# Patient Record
Sex: FEMALE | Race: WHITE
[De-identification: names, ages, dates, MRNs, and addresses within clinical notes are randomized per-mention and may not be internally consistent; named-entity substitution may affect disease eponyms.]

---

## 2018-02-17 ENCOUNTER — HEALTH MAINTENANCE LETTER (OUTPATIENT)
Age: 38
End: 2018-02-17

## 2018-05-07 ENCOUNTER — OFFICE VISIT (OUTPATIENT)
Dept: INTERNAL MEDICINE | Facility: CLINIC | Age: 38
End: 2018-05-07
Payer: COMMERCIAL

## 2018-05-07 VITALS
HEIGHT: 67 IN | WEIGHT: 163.4 LBS | HEART RATE: 73 BPM | BODY MASS INDEX: 25.65 KG/M2 | SYSTOLIC BLOOD PRESSURE: 108 MMHG | OXYGEN SATURATION: 97 % | DIASTOLIC BLOOD PRESSURE: 68 MMHG

## 2018-05-07 DIAGNOSIS — E03.9 HYPOTHYROIDISM, UNSPECIFIED TYPE: ICD-10-CM

## 2018-05-07 DIAGNOSIS — R53.83 OTHER FATIGUE: Primary | ICD-10-CM

## 2018-05-07 DIAGNOSIS — F34.1 DYSTHYMIC DISORDER: ICD-10-CM

## 2018-05-07 DIAGNOSIS — Z00.00 ROUTINE GENERAL MEDICAL EXAMINATION AT A HEALTH CARE FACILITY: ICD-10-CM

## 2018-05-07 DIAGNOSIS — R53.83 OTHER FATIGUE: ICD-10-CM

## 2018-05-07 LAB
CREAT SERPL-MCNC: 0.72 MG/DL (ref 0.52–1.04)
DEPRECATED CALCIDIOL+CALCIFEROL SERPL-MC: 42 UG/L (ref 20–75)
ERYTHROCYTE [DISTWIDTH] IN BLOOD BY AUTOMATED COUNT: 12.3 % (ref 10–15)
FERRITIN SERPL-MCNC: 33 NG/ML (ref 12–150)
GFR SERPL CREATININE-BSD FRML MDRD: >90 ML/MIN/1.7M2
HCT VFR BLD AUTO: 44.1 % (ref 35–47)
HGB BLD-MCNC: 14 G/DL (ref 11.7–15.7)
MCH RBC QN AUTO: 28.6 PG (ref 26.5–33)
MCHC RBC AUTO-ENTMCNC: 31.7 G/DL (ref 31.5–36.5)
MCV RBC AUTO: 90 FL (ref 78–100)
PLATELET # BLD AUTO: 312 10E9/L (ref 150–450)
RBC # BLD AUTO: 4.89 10E12/L (ref 3.8–5.2)
T3 SERPL-MCNC: 145 NG/DL (ref 60–181)
TSH SERPL DL<=0.005 MIU/L-ACNC: 1.05 MU/L (ref 0.4–4)
WBC # BLD AUTO: 6.9 10E9/L (ref 4–11)

## 2018-05-07 RX ORDER — THYROID 120 MG/1
120 TABLET ORAL DAILY
COMMUNITY
End: 2018-10-26

## 2018-05-07 ASSESSMENT — ENCOUNTER SYMPTOMS
HEADACHES: 1
LIGHT-HEADEDNESS: 1
TREMORS: 0
POLYPHAGIA: 0
WEAKNESS: 0
EXERCISE INTOLERANCE: 0
SLEEP DISTURBANCES DUE TO BREATHING: 0
DISTURBANCES IN COORDINATION: 0
PARALYSIS: 0
ORTHOPNEA: 0
SPEECH CHANGE: 0
WEIGHT LOSS: 0
FEVER: 0
MEMORY LOSS: 0
SYNCOPE: 0
HYPOTENSION: 1
LOSS OF CONSCIOUSNESS: 0
DIZZINESS: 0
HALLUCINATIONS: 0
PALPITATIONS: 0
HYPERTENSION: 0
INCREASED ENERGY: 0
POLYDIPSIA: 0
TINGLING: 0
LEG PAIN: 0
NUMBNESS: 0
WEIGHT GAIN: 0
ALTERED TEMPERATURE REGULATION: 1
NIGHT SWEATS: 0
DECREASED APPETITE: 0
FATIGUE: 1
SEIZURES: 0
CHILLS: 1

## 2018-05-07 ASSESSMENT — PAIN SCALES - GENERAL: PAINLEVEL: NO PAIN (0)

## 2018-05-07 NOTE — PATIENT INSTRUCTIONS
[facility]: [phone]     [facility] Medication Refill Request Information:  * Please contact your pharmacy regarding ANY request for medication refills.  ** [facility] Prescription Fax = [phone]  * Please allow 3 business days for routine medication refills.  * Please allow 5 business days for controlled substance medication refills.     [facility] Test Result notification information:  *You will be notified with in 7-10 days of your appointment day regarding the results of your test.  If you are on MyChart you will be notified as soon as the provider has reviewed the results and signed off on them.

## 2018-05-07 NOTE — PROGRESS NOTES
[facility]  Primary Care Center   Dena Guerra MD  05/07/2018      Chief Complaint:   Establish Care    History of Present Illness:   Michelle Maier is a 38 year old female with a history of postpartum depression and hypothyroidism who presents alone to [facility]. The patient is a [place] resident, but is often times in [place] or [place] for a summer camp. She has a three year old son.    Fatigue/Thyroid: The patient has been experiencing fatigue since the birth of her son three years ago. She understands the trials of parenthood, but felt that she would have started feeling better after this length of time. She experienced post-partum depression following her sons birth. She reports falling asleep around 8pm, waking up in the middle of the night for an hour, then getting out of bed around 6 or 7 in the morning. She feels that she sleeps too much and is unusually tired. She does snore. She reports that her hormones changed a lot after she finished breast feeding and also gained a lot of weight. The patient believes her fatigue may be due to hypothyroidism as well, which was diagnosed in 2006 and she would like checked today. She switched to armour a year ago because her prior medication caused a 10-12 pound weight gain and fatigue. The patient reports snoring at night and a regular period with ParaGard and denies restlessness and recent fevers, nausea, weight loss or night sweats..    Stress: The patient moves around a lot and is currently building a house in [place], which creates a lot of stress for her. She feels that she is depressed and would like to start therapy. She previously used antidepressants when her father passed away, but would rather not use medication this time.    Heat Stroke: The patient reports a heat stroke episode yesterday and felt that she almost fainted. Was driving with her son and become sweaty, clammy, and experienced a period cramp. When she arrived to her families house  she immediately laid down on the cold floor. She believes she may have been dehydrated, but this does not regularly happen to her.    Other concerns discussed:  1. She experienced 3rd degree tearing after child birth which created immense pain when she would sit down or have a bowel movement. This lasted for eight months.  2. She had a positive test for a genetic marker for celiac but negative serologies at [place]. She reports excessive acid was found that her body was unable to break down gluten.  3. Previously had a Vitamin D deficiency even though she spends a lot of time outside.  4. Patient has an eye doctor appointment in three days.    We reviewed the following health maintenance topics:  Immunizations  Cancer Screenings (Pap, mammogram, colonoscopy, prostate, skin, lung)  Lipids, STDs, HIV/Hep C screening if applicable  AAA screening, Dexa screening if applicable  Lifestyle factors/Saftey (diet, exercise, weight, stress, seat belts, sun protection)  Risk behaviors (tobacco, alcohol, illicit substances, abuse/violence, depression)  Advanced directive planning if applicable      Review of Systems:   Pertinent items are noted in HPI, remainder of complete ROS is negative.    Answers for HPI/ROS submitted by the patient on 5/7/2018   General Symptoms: Yes  Skin Symptoms: No  HENT Symptoms: No  EYE SYMPTOMS: No  HEART SYMPTOMS: Yes  LUNG SYMPTOMS: No  INTESTINAL SYMPTOMS: No  URINARY SYMPTOMS: No  GYNECOLOGIC SYMPTOMS: No  BREAST SYMPTOMS: No  SKELETAL SYMPTOMS: No  BLOOD SYMPTOMS: No  NERVOUS SYSTEM SYMPTOMS: Yes  MENTAL HEALTH SYMPTOMS: No  Fever: No  Loss of appetite: No  Weight loss: No  Weight gain: No  Fatigue: Yes  Night sweats: No  Chills: Yes  Increased stress: Yes  Excessive hunger: No  Excessive thirst: No  Feeling hot or cold when others believe the temperature is normal: Yes  Loss of height: No  Post-operative complications: No  Surgical site pain: No  Hallucinations: No  Change in or Loss of Energy:  "No  Hyperactivity: No  Confusion: No  Chest pain or pressure: No  Fast or irregular heartbeat: No  Pain in legs with walking: No  Trouble breathing while lying down: No  Fingers or toes appear blue: No  High blood pressure: No  Low blood pressure: Yes  Fainting: No  Murmurs: No  Pacemaker: No  Varicose veins: No  Edema or swelling: No  Wake up at night with shortness of breath: No  Light-headedness: Yes  Exercise intolerance: No  Trouble with coordination: No  Dizziness or trouble with balance: No  Fainting or black-out spells: No  Memory loss: No  Headache: Yes  Seizures: No  Speech problems: No  Tingling: No  Tremor: No  Weakness: No  Difficulty walking: No  Paralysis: No  Numbness: No    Active Medications:      thyroid (ARMOUR THYROID) 120 MG tablet, Take 120 mg by mouth daily, Disp: , Rfl:       Allergies:   Review of patient's allergies indicates no known allergies.      Past Medical History:  Postpartum depression  Depression   Hypothyroidism   Duodenal ulcer     Past Surgical History:  The following history was supplemented using records from [facility] healthcare [facility].   Colonoscopy  Esophagogastroduodenoscopy     Family History:   Mother: undifferentiated connective tissue disease  Father: myelodysplastic syndrome, aplastic anemia       Social History:   The patient is , does not smoke and consumes alcohol.  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 No  BMI 25.35 kg/m2    Constitutional: Alert, oriented, pleasant, no acute distress  Head: Normocephalic, atraumatic  Eyes: Extra-ocular movements intact, no scleral icterus  ENT: Oropharynx clear, moist mucus " membranes, good dentition  Neck: Supple, no lymphadenopathy, no thyromegaly. No thyromegaly or nodules appreciated.  Breast: Deferred per patient.  Cardiovascular: Regular rate and rhythm, no murmurs, rubs or gallops, peripheral pulses full/symmetric  Respiratory: Good air movement bilaterally, lungs clear, no wheezes/rales/rhonchi  GI: Abdomen soft, bowel sounds present, nondistended, nontender, no organomegaly or masses, no rebound/guarding  Musculoskeletal: No edema, normal muscle tone, normal gait  Neurologic: Alert and oriented, cranial nerves 2-12 intact.  Skin: No rashes/lesions  Psychiatric: normal mentation, sad affect and mood.    Assessment and Plan:  Hypothyroidism, unspecified type  I did discuss with her the difference between [place] thyroid medication and levothyroxine. We decided that if her levels remain stable, she will stay on [place] for now given her upcoming travel and difficulty with medication adjustments. However, she is open to changing over to levothyroxine in the future.    Other fatigue  Possibly multifactorial related to thyroid disease and mild depression. She also reports snoring, though she has no other risk factors for TREV. We will check her labs today, I encouraged her to try to get regular exercise and to consider a mental health referral if needed.  - TSH with free T4 reflex  - CBC with platelets  - Vitamin D Deficiency  - Ferritin  - T3 total  - Creatinine    Dysthymic disorder  See above    Routine Health Maintenance  Immunizations (zoster, pneumovax, flu, Tdap, Hep A/B):  The following history was supplemented using records from [facility] healthcare [facility].     Name Dates Previously Given Next Due   HepA, Unspecified 01/01/2010     Td, (Adult) Unspecified 01/01/2010     TyVi (inj) 01/01/2010      Lipids: n/a, normal at [place] previously  Lung Ca Screening (>30 pk age 55-79 or >20 py age 50-79 + RF): n/a  Colonoscopy (50-75 yrs): n/a  Dexa (>65W or 70M yrs): n/a  Mammogram  (40-75 yrs): n/a  Pap (21-65 yrs): Up to date, due in 2019  Safety/Lifestyle: The patient notes working out, but intends to increase her exercise amounts..  Tob/EtOH: low risk  Depression: reviewed  Advanced Directive: deferred    Follow-up: Return if symptoms worsen or fail to improve.      Scribe Disclosure:   I, Myrtle Valdez, am serving as a scribe to document services personally performed by Dena Guerra MD at this visit, based upon the provider's statements to me. All documentation has been reviewed by the aforementioned provider prior to being entered into the official medical record.     Portions of this medical record were completed by a scribe. UPON MY REVIEW AND AUTHENTICATION BY ELECTRONIC SIGNATURE, this confirms (a) I performed the applicable clinical services, and (b) the record is accurate.   Dena Guerra MD  Internal Medicine

## 2018-05-07 NOTE — NURSING NOTE
Chief Complaint   Patient presents with     Establish Care     Patient is here to estbalish a new PCP.       Gyn Exam     Papa is here for pap smear.      Gena Rodriguez LPN at 12:02 PM on 5/7/2018.

## 2018-05-07 NOTE — MR AVS SNAPSHOT
After Visit Summary   5/7/2018    Michelle Maier    MRN: [id number]           Patient Information     Date Of Birth          1980        Visit Information        Provider Department      5/7/2018 12:10 PM Dena Guerra MD [facility]        Today's Diagnoses     Other fatigue    -  1    Routine general medical examination at a health care facility        Hypothyroidism, unspecified type        Dysthymic disorder          Care Instructions    Primary Care Center: [phone]     Primary Care Center Medication Refill Request Information:  * Please contact your pharmacy regarding ANY request for medication refills.  ** [facility] Prescription Fax = [phone]  * Please allow 3 business days for routine medication refills.  * Please allow 5 business days for controlled substance medication refills.     Primary Care Center Test Result notification information:  *You will be notified with in 7-10 days of your appointment day regarding the results of your test.  If you are on MyChart you will be notified as soon as the provider has reviewed the results and signed off on them.          Follow-ups after your visit        Follow-up notes from your care team     Return if symptoms worsen or fail to improve.      Your next 10 appointments already scheduled     May 07, 2018  1:00 PM CDT   LAB with [facility] ([facility])    [address] [place] [id number] [phone]           Please do not eat 10-12 hours before your appointment if you are coming in fasting for labs on lipids, cholesterol, or glucose (sugar). This does not apply to pregnant women. Water, hot tea and black coffee (with nothing added) are okay. Do not drink other fluids, diet soda or chew gum.              Future tests that were ordered for you today     Open Future Orders        Priority Expected Expires Ordered    Creatinine Routine 5/7/2018 5/7/2019  "2018    TSH with free T4 reflex Routine 2018    CBC with platelets Routine 2018    Vitamin D Deficiency Routine 2018    Ferritin Routine 2018    T3 total Routine 2018            Who to contact     Please call your clinic at [phone] to:    Ask questions about your health    Make or cancel appointments    Discuss your medicines    Learn about your test results    Speak to your doctor            Additional Information About Your Visit        [employer] Information     [employer] is an electronic gateway that provides easy, online access to your medical records. With [employer], you can request a clinic appointment, read your test results, renew a prescription or communicate with your care team.     To sign up for [employer] visit the website at [email or web address]   You will be asked to enter the access code listed below, as well as some personal information. Please follow the directions to create your username and password.     Your access code is: [email or web address]  Expires: 2018  6:30 AM     Your access code will  in 90 days. If you need help or a new code, please contact your [facility] or call [phone] for assistance.        Care EveryWhere ID     This is your Care EveryWhere ID. This could be used by other organizations to access your [place] medical records  [id number]        Your Vitals Were     Pulse Height Last Period Pulse Oximetry Breastfeeding? BMI (Body Mass Index)    73 1.71 m (5' 7.32\") 2018 (Approximate) 97% No 25.35 kg/m2       Blood Pressure from Last 3 Encounters:   18 108/68    Weight from Last 3 Encounters:   18 74.1 kg (163 lb 6.4 oz)               Primary Care Provider Office Phone # Fax #    Dena Guerra [phone]       [address] [address]        Equal Access to " Services     [facility]: Hadii aad ku hadvanesadeya Escobar, watrentda luqadaha, qaybta kajacobsarthak cochran, charlotte galdamez. So [facility] [phone].    ATENCIÓN: Si habla español, tiene a zepeda disposición servicios gratuitos de asistencia lingüística. Llame al [phone].    We comply with applicable federal civil rights laws and [place] laws. We do not discriminate on the basis of race, color, national origin, age, disability, sex, sexual orientation, or gender identity.            Thank you!     Thank you for choosing [facility]  for your care. Our goal is always to provide you with excellent care. Hearing back from our patients is one way we can continue to improve our services. Please take a few minutes to complete the written survey that you may receive in the mail after your visit with us. Thank you!             Your Updated Medication List - Protect others around you: Learn how to safely use, store and throw away your medicines at www.disposemymeds.org.          This list is accurate as of 5/7/18 12:53 PM.  Always use your most recent med list.                   Brand Name Dispense Instructions for use Diagnosis    ARMOUR THYROID 120 MG tablet   Generic drug:  thyroid      Take 120 mg by mouth daily

## 2018-05-09 LAB
TTG IGA SER-ACNC: 1 U/ML
TTG IGG SER-ACNC: 1 U/ML

## 2018-05-11 LAB — ENDOMYSIUM IGA TITR SER IF: NORMAL {TITER}

## 2018-10-11 ENCOUNTER — TELEPHONE (OUTPATIENT)
Dept: INTERNAL MEDICINE | Facility: CLINIC | Age: 38
End: 2018-10-11

## 2018-10-11 NOTE — TELEPHONE ENCOUNTER
[facility] Call Center    Phone Message    May a detailed message be left on voicemail: yes    Reason for Call: Other: Patient is returning [facility] call. Patient stated if possible she can email her back on the email on file. I advised patient i am not sure they can email but i would let you know. Other wise you can call her back she will be available on her [phone] number. Please follow up Thank you.     Action Taken: Message routed to:  [facility] ([facility]): Primary Care

## 2018-10-11 NOTE — TELEPHONE ENCOUNTER
[facility] Call Center    Phone Message    May a detailed message be left on voicemail: yes    Reason for Call: Other: Pt called in regards to whether or not she needs to get a bloodwork or lab done for Thyroid to continue her precscription. Pt has only one refill left and would like to continue the prescription. Please call back to pt.      Action Taken: Message routed to:  [facility] ([facility]): UGO

## 2018-10-12 NOTE — TELEPHONE ENCOUNTER
Spoke to patient on the phone, patient does not state any new symptoms or changes. Patient states that current dose seems to be good. Patient was informed to notify the pharmacy when she would need a refill request. Cheryl Navarrete LPN 10/12/2018 2:13 PM

## 2018-10-26 DIAGNOSIS — E03.9 HYPOTHYROIDISM: Primary | ICD-10-CM

## 2018-10-28 NOTE — TELEPHONE ENCOUNTER
thyroid (NICHOLAS THYROID) 120 MG tablet  Last Written Prescription Date:  unknown  Last Fill Quantity: unknown,   # refills: unknown  Last Office Visit : 5/7/18  Future Office visit:  None    Routing  Because: dx?  historical

## 2018-10-31 RX ORDER — THYROID 120 MG/1
120 TABLET ORAL DAILY
Qty: 90 TABLET | Refills: 1 | Status: SHIPPED | OUTPATIENT
Start: 2018-10-31 | End: 2019-05-20

## 2019-05-20 DIAGNOSIS — E03.9 HYPOTHYROIDISM: ICD-10-CM

## 2019-05-21 RX ORDER — THYROID, PORCINE 120 MG/1
TABLET ORAL
Qty: 90 TABLET | Refills: 0 | Status: SHIPPED | OUTPATIENT
Start: 2019-05-21 | End: 2019-09-13

## 2019-05-21 NOTE — TELEPHONE ENCOUNTER
thyroid (ARMOUR THYROID) 120 MG tablet      Last Written Prescription Date:  10/31/18  Last Fill Quantity: 90,   # refills: 1  Last Office Visit : 5/7/18  Future Office visit: none    90 day to pharmacy.     Scheduling has been notified to contact the pt for appointment/ overdue lab.

## 2019-09-13 DIAGNOSIS — E03.9 HYPOTHYROIDISM: ICD-10-CM

## 2019-09-17 ENCOUNTER — TELEPHONE (OUTPATIENT)
Dept: INTERNAL MEDICINE | Facility: CLINIC | Age: 39
End: 2019-09-17

## 2019-09-17 RX ORDER — THYROID 120 MG/1
120 TABLET ORAL DAILY
Qty: 30 TABLET | Refills: 0 | Status: SHIPPED | OUTPATIENT
Start: 2019-09-17 | End: 2019-09-18

## 2019-09-17 NOTE — TELEPHONE ENCOUNTER
----- Message from Clare Friedman RN sent at 9/17/2019  3:18 PM CDT -----  Regarding: needs appt/ thyroid labs for refill  Overdue for follow up and labs.  Please call to schedule.    Thanks    I do not need any follow up on the scheduling of this appointment unless the patient will no longer be receiving care in our clinic.

## 2019-09-17 NOTE — TELEPHONE ENCOUNTER
NICHOLAS THYROID 120 MG  TAB      Last Written Prescription Date:  5/21/2019  Last Fill Quantity: 90,   # refills: 0  Last Office Visit : 5/7/2018  Future Office visit:  None  Scheduling has been notified to contact the pt for appointment and have contacted via [employer] on 7/8/19    Routing refill request to provider for review/approval because:  [place] thyroid not on the [facility], [facility] or [facility] refill protocol .  Has not been seen in 16 months.  Appt and labs due.  Lab Test 05/07/18  1309   TSH 1.05

## 2019-09-18 ENCOUNTER — TELEPHONE (OUTPATIENT)
Dept: INTERNAL MEDICINE | Facility: CLINIC | Age: 39
End: 2019-09-18

## 2019-09-18 DIAGNOSIS — E03.9 HYPOTHYROIDISM: ICD-10-CM

## 2019-09-18 RX ORDER — THYROID 120 MG/1
120 TABLET ORAL DAILY
Qty: 30 TABLET | Refills: 0 | Status: SHIPPED | OUTPATIENT
Start: 2019-09-18 | End: 2019-12-11

## 2019-09-18 NOTE — TELEPHONE ENCOUNTER
30 day refill was sent to the pharmacy yesterday, but pt has an appt on 11/4, needs more pills. I will send another 30 day refill today. Left a detailed message to the pt regarding this, or call me back with questions.

## 2019-09-18 NOTE — TELEPHONE ENCOUNTER
[facility] Call Center    Phone Message    May a detailed message be left on voicemail: yes, Pt is wanting to know if she can get a refill. Pt states she is traveling for the next month, and she states she had scheduled an apt .     Reason for Call: Medication Refill Request    Has the patient contacted the pharmacy for the refill? Yes   Name of medication being requested: thyroid (ARMOUR THYROID) 120 MG tablet  Provider who prescribed the medication: Charlie Fernandes  Pharmacy: [facility] [address]. Phone: [phone]  Date medication is needed: 9/18/2019         Action Taken: Message routed to:  [facility]

## 2019-10-30 ENCOUNTER — PRE VISIT (OUTPATIENT)
Dept: INTERNAL MEDICINE | Facility: CLINIC | Age: 39
End: 2019-10-30

## 2019-11-02 ENCOUNTER — HEALTH MAINTENANCE LETTER (OUTPATIENT)
Age: 39
End: 2019-11-02

## 2019-11-04 ENCOUNTER — OFFICE VISIT (OUTPATIENT)
Dept: INTERNAL MEDICINE | Facility: CLINIC | Age: 39
End: 2019-11-04
Payer: COMMERCIAL

## 2019-11-04 VITALS
DIASTOLIC BLOOD PRESSURE: 76 MMHG | BODY MASS INDEX: 23.27 KG/M2 | HEART RATE: 66 BPM | OXYGEN SATURATION: 99 % | WEIGHT: 150 LBS | SYSTOLIC BLOOD PRESSURE: 119 MMHG

## 2019-11-04 DIAGNOSIS — E03.9 HYPOTHYROIDISM: ICD-10-CM

## 2019-11-04 DIAGNOSIS — F32.A DEPRESSION, UNSPECIFIED DEPRESSION TYPE: Primary | ICD-10-CM

## 2019-11-04 DIAGNOSIS — E03.9 HYPOTHYROIDISM, UNSPECIFIED TYPE: ICD-10-CM

## 2019-11-04 LAB
ANION GAP SERPL CALCULATED.3IONS-SCNC: 5 MMOL/L (ref 3–14)
BUN SERPL-MCNC: 10 MG/DL (ref 7–30)
CALCIUM SERPL-MCNC: 9.3 MG/DL (ref 8.5–10.1)
CHLORIDE SERPL-SCNC: 105 MMOL/L (ref 94–109)
CO2 SERPL-SCNC: 27 MMOL/L (ref 20–32)
CREAT SERPL-MCNC: 0.79 MG/DL (ref 0.52–1.04)
GFR SERPL CREATININE-BSD FRML MDRD: >90 ML/MIN/{1.73_M2}
GLUCOSE SERPL-MCNC: 88 MG/DL (ref 70–99)
POTASSIUM SERPL-SCNC: 4 MMOL/L (ref 3.4–5.3)
SODIUM SERPL-SCNC: 137 MMOL/L (ref 133–144)
T3 SERPL-MCNC: 91 NG/DL (ref 60–181)
T4 FREE SERPL-MCNC: 0.78 NG/DL (ref 0.76–1.46)
TSH SERPL DL<=0.005 MIU/L-ACNC: 0.71 MU/L (ref 0.4–4)

## 2019-11-04 RX ORDER — THYROID 120 MG/1
120 TABLET ORAL DAILY
Qty: 30 TABLET | Refills: 0 | Status: CANCELLED | OUTPATIENT
Start: 2019-11-04

## 2019-11-04 ASSESSMENT — ANXIETY QUESTIONNAIRES
3. WORRYING TOO MUCH ABOUT DIFFERENT THINGS: NOT AT ALL
5. BEING SO RESTLESS THAT IT IS HARD TO SIT STILL: NOT AT ALL
GAD7 TOTAL SCORE: 0
GAD7 TOTAL SCORE: 0
1. FEELING NERVOUS, ANXIOUS, OR ON EDGE: NOT AT ALL
GAD7 TOTAL SCORE: 0
7. FEELING AFRAID AS IF SOMETHING AWFUL MIGHT HAPPEN: NOT AT ALL
4. TROUBLE RELAXING: NOT AT ALL
7. FEELING AFRAID AS IF SOMETHING AWFUL MIGHT HAPPEN: NOT AT ALL
6. BECOMING EASILY ANNOYED OR IRRITABLE: NOT AT ALL
2. NOT BEING ABLE TO STOP OR CONTROL WORRYING: NOT AT ALL

## 2019-11-04 ASSESSMENT — PATIENT HEALTH QUESTIONNAIRE - PHQ9
SUM OF ALL RESPONSES TO PHQ QUESTIONS 1-9: 1
10. IF YOU CHECKED OFF ANY PROBLEMS, HOW DIFFICULT HAVE THESE PROBLEMS MADE IT FOR YOU TO DO YOUR WORK, TAKE CARE OF THINGS AT HOME, OR GET ALONG WITH OTHER PEOPLE: NOT DIFFICULT AT ALL
SUM OF ALL RESPONSES TO PHQ QUESTIONS 1-9: 1

## 2019-11-04 ASSESSMENT — PAIN SCALES - GENERAL: PAINLEVEL: NO PAIN (0)

## 2019-11-04 NOTE — PATIENT INSTRUCTIONS
[facility] Medication Refill Request Information:  * Please contact your pharmacy regarding ANY request for medication refills.  ** [facility] Prescription Fax = [phone]  * Please allow 3 business days for routine medication refills.  * Please allow 5 business days for controlled substance medication refills.     [facility] Test Result notification information:  *You will be notified with in 7-10 days of your appointment day regarding the results of your test.  If you are on MyChart you will be notified as soon as the provider has reviewed the results and signed off on them.    [facility]: [phone]

## 2019-11-04 NOTE — NURSING NOTE
Chief Complaint   Patient presents with     Medication Follow-up     Pt is here to follow up on thyroid medication.      Gena Rodriguez LPN at 5:18 PM on 11/4/2019.

## 2019-11-04 NOTE — PROGRESS NOTES
[facility]  Primary Care Center   Dena Guerra MD  11/04/2019      Chief Complaint:   Medication Follow-up       History of Present Illness:   Michelle Maier is a 39 year old female with a history of hypothyroidism who presents for physical. She was started on [place] thyroid by an outside physician after the birth of her son and she feels this is working well for her. She did try Levothyroxine but was changed by a provider in the [place] system. Her hypothyroidism was diagnosed in 2006, prior to her pregnancy. She was depressed post-partum as well.  Denies significant weight changes, palpitations, anxiety, change in stools. It feels like she is getting back to her normal self.     Mood: She and her  are , which feels like a positive move for her. Her son is still with her, he is now 4. She started a job at the [employer] in [place] within the last month.    We reviewed the following health maintenance topics:  Immunizations  Cancer Screenings (Pap, mammogram, colonoscopy, prostate, skin, lung)  Lipids, STDs, HIV/Hep C screening if applicable  AAA screening, Dexa screening if applicable  Lifestyle factors/Saftey (diet, exercise, weight, stress, seat belts, sun protection)  Risk behaviors (tobacco, alcohol, illicit substances, abuse/violence, depression)  Advanced directive planning if applicable    Review of Systems:   Pertinent items are noted in HPI, remainder of complete ROS is negative.      Active Medications:     Current Outpatient Medications:      thyroid (ARMOUR THYROID) 120 MG tablet, Take 1 tablet (120 mg) by mouth daily Patient needs to see primary provider and have labs for further refills., Disp: 30 tablet, Rfl: 0      Allergies:   Patient has no known allergies.      Past Medical History:  Duodenal ulcer  Hypothyroidism  Post-partum depression     Family History:   Mother: connective tissue disorder  Father: blood disease     Social History:   Unmarried  Non  smoker    Physical Exam:   /76 (BP Location: Right arm, Patient Position: Sitting, Cuff Size: Adult Regular)   Pulse 66   Wt 68 kg (150 lb)   SpO2 99%   Breastfeeding? No   BMI 23.27 kg/m     Constitutional: Alert, oriented, pleasant, no acute distress  Head: Normocephalic, atraumatic  Eyes: Extra-ocular movements intact, no scleral icterus  ENT: Oropharynx clear, moist mucus membranes, good dentition  Neck: Supple, no lymphadenopathy.  Cardiovascular: Regular rate and rhythm, no murmurs, rubs or gallops, peripheral pulses full/symmetric  Respiratory: Good air movement bilaterally, lungs clear, no wheezes/rales/rhonchi  Musculoskeletal: No edema, normal muscle tone, normal gait  Neurologic: Alert and oriented, cranial nerves 2-12 intact.  Skin: No rashes/lesions  Psychiatric: normal mentation, affect and mood     Assessment and Plan:  Hypothyroidism  Patient denies any concerning symptoms today. We did discuss that [place] is not recommended for treatment of hypothyroidism. We will repeat TFTs today, and revisit transitioning her to LT4 in the future.   - TSH  - T4 free  - T3 total  - Basic metabolic panel    Depression  Well controlled currently.    She is due for a pap smear and was advised to schedule this at her convenience.       Follow-up: No follow-ups on file.        Scribe Disclosure:  I, Pratik Pemberton, am serving as a scribe to document services personally performed by Dena Guerra MD at this visit, based upon the provider's statements to me. All documentation has been reviewed by the aforementioned provider prior to being entered into the official medical record.    Portions of this medical record were completed by a scribe. UPON MY REVIEW AND AUTHENTICATION BY ELECTRONIC SIGNATURE, this confirms (a) I performed the applicable clinical services, and (b) the record is accurate.   Dena Guerra MD  Internal Medicine

## 2019-11-05 ASSESSMENT — ANXIETY QUESTIONNAIRES: GAD7 TOTAL SCORE: 0

## 2019-12-10 DIAGNOSIS — E03.9 HYPOTHYROIDISM: ICD-10-CM

## 2019-12-11 RX ORDER — THYROID 120 MG/1
120 TABLET ORAL DAILY
Qty: 30 TABLET | Refills: 11 | Status: SHIPPED | OUTPATIENT
Start: 2019-12-11 | End: 2020-07-15

## 2019-12-11 NOTE — TELEPHONE ENCOUNTER
thyroid (ARMOUR THYROID) 120 MG tablet      Last Written Prescription Date:  9-18-19  Last Fill Quantity: 30,   # refills: 0  Last Office Visit : 11-4-19  Future Office visit:  none    Routing refill request to provider for review/approval because:  High dose thyroid medication.      Kathleen M Doege RN

## 2019-12-21 ENCOUNTER — TELEPHONE (OUTPATIENT)
Dept: OTHER | Facility: CLINIC | Age: 39
End: 2019-12-21

## 2020-03-19 ENCOUNTER — TELEPHONE (OUTPATIENT)
Dept: INTERNAL MEDICINE | Facility: CLINIC | Age: 40
End: 2020-03-19

## 2020-03-19 ENCOUNTER — HOSPITAL ENCOUNTER (EMERGENCY)
Facility: CLINIC | Age: 40
Discharge: HOME | End: 2020-03-19
Attending: EMERGENCY MEDICINE | Admitting: EMERGENCY MEDICINE
Payer: COMMERCIAL

## 2020-03-19 VITALS
SYSTOLIC BLOOD PRESSURE: 116 MMHG | TEMPERATURE: 98.1 F | BODY MASS INDEX: 21.97 KG/M2 | RESPIRATION RATE: 12 BRPM | WEIGHT: 140 LBS | HEART RATE: 86 BPM | DIASTOLIC BLOOD PRESSURE: 70 MMHG | OXYGEN SATURATION: 99 % | HEIGHT: 67 IN

## 2020-03-19 DIAGNOSIS — M54.16 LUMBAR BACK PAIN WITH RADICULOPATHY AFFECTING LEFT LOWER EXTREMITY: ICD-10-CM

## 2020-03-19 PROCEDURE — 25000128 H RX IP 250 OP 636: Performed by: EMERGENCY MEDICINE

## 2020-03-19 PROCEDURE — 99284 EMERGENCY DEPT VISIT MOD MDM: CPT | Mod: 25

## 2020-03-19 PROCEDURE — 25000132 ZZH RX MED GY IP 250 OP 250 PS 637: Performed by: EMERGENCY MEDICINE

## 2020-03-19 PROCEDURE — 96372 THER/PROPH/DIAG INJ SC/IM: CPT

## 2020-03-19 RX ORDER — HYDROCODONE BITARTRATE AND ACETAMINOPHEN 5; 325 MG/1; MG/1
2 TABLET ORAL ONCE
Status: COMPLETED | OUTPATIENT
Start: 2020-03-19 | End: 2020-03-19

## 2020-03-19 RX ORDER — METHYLPREDNISOLONE 4 MG
TABLET, DOSE PACK ORAL
Qty: 21 TABLET | Refills: 0 | Status: SHIPPED | OUTPATIENT
Start: 2020-03-19 | End: 2021-01-27

## 2020-03-19 RX ORDER — HYDROCODONE BITARTRATE AND ACETAMINOPHEN 5; 325 MG/1; MG/1
1-2 TABLET ORAL EVERY 6 HOURS PRN
Qty: 12 TABLET | Refills: 0 | Status: SHIPPED | OUTPATIENT
Start: 2020-03-19 | End: 2020-03-22

## 2020-03-19 RX ORDER — CYCLOBENZAPRINE HCL 10 MG
10 TABLET ORAL 3 TIMES DAILY PRN
Qty: 15 TABLET | Refills: 0 | Status: SHIPPED | OUTPATIENT
Start: 2020-03-19 | End: 2020-03-24

## 2020-03-19 RX ORDER — LIDOCAINE 4 G/G
1 PATCH TOPICAL ONCE
Status: DISCONTINUED | OUTPATIENT
Start: 2020-03-19 | End: 2020-03-19 | Stop reason: HOSPADM

## 2020-03-19 RX ORDER — CYCLOBENZAPRINE HCL 10 MG
10 TABLET ORAL ONCE
Status: COMPLETED | OUTPATIENT
Start: 2020-03-19 | End: 2020-03-19

## 2020-03-19 RX ORDER — IBUPROFEN 600 MG/1
600 TABLET, FILM COATED ORAL EVERY 6 HOURS PRN
Qty: 40 TABLET | Refills: 0 | Status: SHIPPED | OUTPATIENT
Start: 2020-03-19 | End: 2020-03-29

## 2020-03-19 RX ORDER — LIDOCAINE 50 MG/G
1 PATCH TOPICAL EVERY 24 HOURS
Qty: 10 PATCH | Refills: 0 | Status: SHIPPED | OUTPATIENT
Start: 2020-03-19 | End: 2020-03-29

## 2020-03-19 RX ORDER — KETOROLAC TROMETHAMINE 15 MG/ML
15 INJECTION, SOLUTION INTRAMUSCULAR; INTRAVENOUS ONCE
Status: COMPLETED | OUTPATIENT
Start: 2020-03-19 | End: 2020-03-19

## 2020-03-19 RX ADMIN — KETOROLAC TROMETHAMINE 15 MG: 15 INJECTION, SOLUTION INTRAMUSCULAR; INTRAVENOUS at 12:08

## 2020-03-19 RX ADMIN — HYDROCODONE BITARTRATE AND ACETAMINOPHEN 2 TABLET: 5; 325 TABLET ORAL at 12:08

## 2020-03-19 RX ADMIN — LIDOCAINE 1 PATCH: 560 PATCH PERCUTANEOUS; TOPICAL; TRANSDERMAL at 12:08

## 2020-03-19 RX ADMIN — CYCLOBENZAPRINE HYDROCHLORIDE 10 MG: 10 TABLET, FILM COATED ORAL at 12:08

## 2020-03-19 ASSESSMENT — ENCOUNTER SYMPTOMS
NUMBNESS: 0
DIARRHEA: 1
BACK PAIN: 1
CONSTIPATION: 0
FEVER: 0

## 2020-03-19 ASSESSMENT — MIFFLIN-ST. JEOR: SCORE: 1337.67

## 2020-03-19 NOTE — ED TRIAGE NOTES
PT reports doing something over weekend causing left low back pain which travels into left leg and buttock. PT reports numbness in those areas starting today. Pt denies loss of bowel or bladder.

## 2020-03-19 NOTE — TELEPHONE ENCOUNTER
[facility] Call Center    Phone Message    May a detailed message be left on voicemail: no     Reason for Call: Order(s): Other:   Reason for requested: MRI of her Back after ER admission  Date needed: Asap  Provider name: Dr. Guerra  Comments: Please fax to [facility], [phone]. Please call back to confirm.      Action Taken: Message routed to:  [facility]    Travel Screening: Not Applicable

## 2020-03-19 NOTE — ED PROVIDER NOTES
"  History     Chief Complaint:  Back Pain    The history is provided by the patient.      Michelle Maier is a 40 year old female who presents for evaluation of left lower back pain. She bent over to  a water bottle three days ago and developed left lower back pain. She notes the pain is currently 3/10 when supine, but 10/10 \"worse than childbirth\" with any movement. The pain does radiate towards her left proximal thigh and today she developed tingling radiating down the entirety of her left lower extremity which concerned her and prompted her visit. She has no associated constipation with a single episode of diarrhea yesterday. She denies urinary or fecal incontinence, fever, history of cancer, and history of IV drug use, She has full sensation when wiping after urination.     She tried icing her back for the first 24 hours without improvement, so she applied heat which helped. She was starting to feel better but had regression after sitting in a car for 20 minutes yesterday. She has tried Tylenol and ibuprofen (last dose last night) without improvement.     Notably, she has no personal history of back pain but reports a strong family history of similar issues.    Allergies:  No Known Drug Allergies     Medications:    Lavelle    Past Medical History:    Duodenal ulcer  Hypothyroidism  Postpartum depression    Past Surgical History:    The patient does not have any pertinent past surgical history.    Family History:    Blood disease  Connective tissue disorder    Social History:  Marital Status:  Single   In the room alone. Her father brought her to the ER.  Negative for tobacco use.  Positive for alcohol use.   Negative for drug use.     Review of Systems   Constitutional: Negative for fever.   Gastrointestinal: Positive for diarrhea. Negative for constipation.   Musculoskeletal: Positive for back pain.   Neurological: Negative for numbness.        Tingling   All other systems reviewed and are " "negative.    Physical Exam     Patient Vitals for the past 24 hrs:   BP Temp Temp src Pulse Heart Rate Resp SpO2 Height Weight   03/19/20 1331 -- -- -- -- 68 12 99 % -- --   03/19/20 1117 116/70 98.1  F (36.7  C) Oral 86 -- 20 99 % 1.702 m (5' 7\") 63.5 kg (140 lb)       Physical Exam  General: Well-developed and well-nourished. Well appearing middle aged  woman. Cooperative.  Head:  Atraumatic.  Eyes:  Conjunctivae, lids, and sclerae are normal.  ENT:    Normal nose. Moist mucous membranes.  Neck:  Supple. Normal range of motion.  CV:  2+ DP bilaterally.  Resp:  No respiratory distress.   GI:  Non-distended.   MS:  Normal ROM. No bilateral lower extremity edema.  Mild tenderness over the left paraspinal lumbar musculature without midline tenderness.  Skin:  Warm. Non-diaphoretic. No pallor.  Neuro:  Awake. A&Ox3. Normal strength including 5/5 strength with plantarflexion and dorsiflexion.  Sensation intact to light touch throughout including perineum.  Psych: Normal mood and affect. Normal speech.  Vitals reviewed.    Emergency Department Course     Interventions:  1208 Lidocaine, 1 patch, Transdermal  1208 Norco 5-325 mg per tablet, 2 tablet, PO  1208 Cyclobenzaprine, 10 mg, PO  1208 Toradol, 15 mg, Intramuscular    Emergency Department Course:  Past medical records, nursing notes, and vitals reviewed.    1145 I performed an exam of the patient as documented above.     1316 I rechecked the patient and discussed the results of her workup thus far. She is feeling improved and is ready to be discharged.    Findings and plan explained to the patient. Patient discharged home with instructions regarding supportive care, medications, and reasons to return. The importance of close follow-up was reviewed. The patient was prescribed cyclobenzaprine, Norco, ibuprofen, Lidocaine patches, and a Medrol Dosepak.    I personally answered all related questions prior to discharge.     Impression & Plan     Medical Decision " Making:  Michelle is a 40-year-old female who was reaching down to pick something up 3 days ago and developed left-sided low back pain.  It has somewhat waxed and waned since then but particularly over the last 24 hours has been constant and is now 10/10 with movement.  She is now experiencing a tingling sensation going all the way down to her toes which ultimately prompted her presentation.  Fortunately, she has no red flag features during history including no fever, history of cancer, or history of IV drug use.  She has no red flag features on exam either including no midline tenderness and no perineal paresthesias or weakness/foot drop.  As such, there is no evidence of cauda equina syndrome or more worrisome pathology and MRI can safely be deferred.  Laboratory studies are unlikely to .  Instead she was treated with Norco, Flexeril, Toradol, and Lidoderm patch and had improvement in her pain such that she was requesting discharge.  I had a very long discussion with Michelle about lumbar back pain/sciatica and treatment thereof.  She will use Motrin, Lidoderm, and heating pads and I think she is also very good candidate for a Medrol Dosepak which she will start as directed.  However, if her pain is more severe she will also take Flexeril and Norco.  I have encouraged her to follow-up with primary care or orthopedics as she may require an MRI for further diagnosis or may need other treatment such as physical therapy or steroid injections.  She understands all of this plan and will also return if she is having worsening symptoms or development of new concerns including, but not limited to, those for cauda equina syndrome.  All of her questions were answered and she verbalized understanding.  Amenable to discharge.    Diagnosis:    ICD-10-CM    1. Lumbar back pain with radiculopathy affecting left lower extremity  M54.16        Disposition:  Discharged home.    Discharge Medications:  Discharge  Medication List as of 3/19/2020  1:23 PM      START taking these medications    Details   cyclobenzaprine (FLEXERIL) 10 MG tablet Take 1 tablet (10 mg) by mouth 3 times daily as needed for muscle spasms, Disp-15 tablet,R-0, Local Print      HYDROcodone-acetaminophen (NORCO) 5-325 MG tablet Take 1-2 tablets by mouth every 6 hours as needed for pain, Disp-12 tablet,R-0, Local Print      ibuprofen (ADVIL/MOTRIN) 600 MG tablet Take 1 tablet (600 mg) by mouth every 6 hours as needed for moderate pain, Disp-40 tablet,R-0, Local Print      lidocaine (LIDODERM) 5 % patch Place 1 patch onto the skin every 24 hours for 10 daysDisp-10 patch,R-0Local Print      methylPREDNISolone (MEDROL DOSEPAK) 4 MG tablet therapy pack Follow Package Directions, Disp-21 tablet,R-0, Local Print             Scribe Disclosure:  Apurva TOLEDO, am serving as a scribe at 11:45 AM on 3/19/2020 to document services personally performed by Joana Clark MD based on my observations and the provider's statements to me.      EMERGENCY DEPARTMENT       Joana Clark MD  03/20/20 9324

## 2020-03-19 NOTE — ED AVS SNAPSHOT
Emergency Department  [address] [facility] [address]  Phone:  [phone]  Fax:  [phone]                                    Michelle Maier   MRN: [id number]    Department:   Emergency Department   Date of Visit:  3/19/2020           After Visit Summary Signature Page    I have received my discharge instructions, and my questions have been answered. I have discussed any challenges I see with this plan with the nurse or doctor.    ..........................................................................................................................................  Patient/Patient Representative Signature      ..........................................................................................................................................  Patient Representative Print Name and Relationship to Patient    ..................................................               ................................................  Date                                   Time    ..........................................................................................................................................  Reviewed by Signature/Title    ...................................................              ..............................................  Date                                               Time          22EPIC Rev 08/18

## 2020-03-19 NOTE — DISCHARGE INSTRUCTIONS
Take Motrin and use LidoDerm and heating pads for pain.    Take steroid pack as directed.    If your pain is severe, you can also take Flexeril (muscle relaxer) and Norco (narcotic pain pill). These will make you drowsy so you cannot drive or operate heavy machinery after taking. Use with caution as Norco is also addictive. Norco will make you constipated so consider over the counter stool softeners.    Follow up with primary care and/or orthopedics. Your pain will likely improve with time, but if not you may require an MRI for further diagnosis and may need other treatments such as physical therapy or steroid injections.    Return to the ER with weakness of the leg(s), new/worsening numbness or tingling, difficulty urinating or urinary incontinence, constipation or stool incontinence, fever >100.4F, or any other concerns.

## 2020-03-20 NOTE — TELEPHONE ENCOUNTER
Pt's Mom called with additional details on MRI order: Should it be at [facility]? Can it be at [facility] as they are in network. They want to get it asap.]  If still going to [facility], it needs to be faxed to MRI Dept, F [phone]. If it goes to regular fax, it won't get acted on right away.   Please call the pt's mom Mallorie at [phone] to confirm. Thanks.

## 2020-03-20 NOTE — TELEPHONE ENCOUNTER
Encounter already routed to provider for review.   Annette Mcdaniels, EMT at 11:43 AM on 3/20/2020.

## 2020-03-23 NOTE — TELEPHONE ENCOUNTER
An MRI is not something I am able to order without assessing the patient for this problem. She should schedule an office visit with any provider or consider going to a walk-in ortho clinic if she has concern.

## 2020-03-23 NOTE — TELEPHONE ENCOUNTER
Per notes, appears staff has called patient back. Message was sent to provider for review if provider would like to place order for MRI. Awaiting providers responds. Cheryl Navarrete LPN 3/23/2020 10:51 AM

## 2020-03-23 NOTE — TELEPHONE ENCOUNTER
[facility] Call Center    Phone Message    May a detailed message be left on voicemail: no     Reason for Call: Other: Pt is calling again to get MRI order; please call Pt back.     Action Taken: Message routed to:  [facility]    Travel Screening: Not Applicable

## 2020-03-24 NOTE — TELEPHONE ENCOUNTER
Spoke to patient to relay providers message and the patient had reached out to another provider that specializes in back/spine. Patient does not need an appointment or MRI at this time. Cheryl Navarrete LPN 3/24/2020 8:37 AM

## 2020-03-25 ENCOUNTER — TRANSFERRED RECORDS (OUTPATIENT)
Dept: HEALTH INFORMATION MANAGEMENT | Facility: CLINIC | Age: 40
End: 2020-03-25

## 2020-07-13 ENCOUNTER — OFFICE VISIT (OUTPATIENT)
Dept: INTERNAL MEDICINE | Facility: CLINIC | Age: 40
End: 2020-07-13
Payer: COMMERCIAL

## 2020-07-13 VITALS
WEIGHT: 146 LBS | TEMPERATURE: 98.4 F | OXYGEN SATURATION: 99 % | SYSTOLIC BLOOD PRESSURE: 102 MMHG | BODY MASS INDEX: 22.87 KG/M2 | HEART RATE: 67 BPM | DIASTOLIC BLOOD PRESSURE: 65 MMHG

## 2020-07-13 DIAGNOSIS — Z80.3 FAMILY HISTORY OF MALIGNANT NEOPLASM OF BREAST: Primary | ICD-10-CM

## 2020-07-13 DIAGNOSIS — E03.9 HYPOTHYROIDISM, UNSPECIFIED TYPE: ICD-10-CM

## 2020-07-13 DIAGNOSIS — Z12.31 ENCOUNTER FOR SCREENING MAMMOGRAM FOR BREAST CANCER: ICD-10-CM

## 2020-07-13 DIAGNOSIS — Z12.4 SCREENING FOR CERVICAL CANCER: ICD-10-CM

## 2020-07-13 DIAGNOSIS — Z13.220 SCREENING FOR HYPERLIPIDEMIA: ICD-10-CM

## 2020-07-13 LAB
T4 FREE SERPL-MCNC: 1.14 NG/DL (ref 0.76–1.46)
TSH SERPL DL<=0.005 MIU/L-ACNC: 4.34 MU/L (ref 0.4–4)

## 2020-07-13 ASSESSMENT — PAIN SCALES - GENERAL: PAINLEVEL: NO PAIN (0)

## 2020-07-13 NOTE — PATIENT INSTRUCTIONS
Primary Care Center Phone Number [phone]  Primary Care Center Medication Refill Request Information:  * Please contact your pharmacy regarding ANY request for medication refills.  ** [facility] Prescription Fax = [phone]  * Please allow 3 business days for routine medication refills.  * Please allow 5 business days for controlled substance medication refills.     [facility] Test Result notification information:  *You will be notified with in 7-10 days of your appointment day regarding the results of your test.  If you are on MyChart you will be notified as soon as the provider has reviewed the results and signed off on them.

## 2020-07-13 NOTE — NURSING NOTE
Chief Complaint   Patient presents with     Physical     Pt is here for a physical.     Depression Response    Patient completed the PHQ-9 assessment for depression and scored >9? No  Question 9 on the PHQ-9 was positive for suicidality? No    I personally notified the following: visit provider     TAMMY Garcia 11:08 AM  7/13/2020

## 2020-07-13 NOTE — PROGRESS NOTES
Ms. Maier is a 40 year old female here for physical.    History of Present Illness:    Still working at [employer].  Injured back while bending over in March, had sciatica, went to ER.  Trialed PT and now feeling better.    Cousin age 29 diagnosed with invasive ductal carcinoma HER2+. No other family history of malignancy.    Thyroid has been stable. Son is in [place] at summer school. Going through a divorce but feels like she is handling it okay emotionally.    Patient complaining of bump in vagina. Not painful, no pus.  No concerns about STIs.  Gets regular cycles.  Gets menstrual migraines.  Currently has sharp migraine, aleve helps.    Routine Health Maintenence:  Immunizations (zoster, pneumovax, flu, Tdap, Hep A/B):   Most Recent Immunizations   Administered Date(s) Administered     HEPA 01/01/2010     Td (Adult), Adsorbed 01/01/2010     Typhoid IM 01/01/2010     Lipids: No results for input(s): CHOL, HDL, LDL, TRIG, CHOLHDLRATIO in the last 06184 hours.  Lung Ca Screening (>30 pk age 55-79 or >20 py age 50-79 + RF):  Colonoscopy (50-75 yrs): n/a  Dexa (>65W or 70M yrs): n/a  Mammogram (40-75 yrs): ordered  Pap (21-65 yrs): today  Pelvic/Breast: today  GC/Chlam (<25 yrs): n/a  HIV/HCV if risk factors: deferred  Safety/Lifestyle: reviewed  Tob/EtOH: low risk  Depression: screen neg  Advanced Directive: deferred    A full 10-pt Review of Systems was performed, verified and is negative except as documented in the HPI.  All health questionnaires were reviewed, verified and relevant information documented above.    Past Medical History:  Past Medical History:   Diagnosis Date     Duodenal ulcer     briefly gluten free diet, positive for genetic marker for celiac     Hypothyroidism      Postpartum depression 2015       Past Surgical History:  Past Surgical History:   Procedure Laterality Date     NO HISTORY OF SURGERY         Active Meds:  Current Outpatient Medications   Medication     methylPREDNISolone (MEDROL  DOSEPAK) 4 MG tablet therapy pack     thyroid (ARMOUR THYROID) 120 MG tablet     No current facility-administered medications for this visit.         Allergies:  Patient has no known allergies.    Family History:  family history includes Blood Disease in her father; Connective Tissue Disorder in her mother.    Social History:  Social History     Tobacco Use     Smoking status: Never Smoker     Smokeless tobacco: Never Used   Substance Use Topics     Alcohol use: Yes     Alcohol/week: 2.0 standard drinks     Types: 2 Standard drinks or equivalent per week     Drug use: None       Physical Exam:  Vitals: /65   Pulse 67   Temp 98.4  F (36.9  C) (Oral)   Wt 66.2 kg (146 lb)   SpO2 99%   BMI 22.87 kg/m    Constitutional: Alert, oriented, pleasant, no acute distress  Head: Normocephalic, atraumatic  Eyes: Extra-ocular movements intact, pupils equally round and reactive bilaterally, no scleral icterus  ENT: Oropharynx clear, moist mucus membranes, good dentition  Neck: Supple, no lymphadenopathy, no thyromegaly  Cardiovascular: Regular rate and rhythm, no murmurs, rubs or gallops, peripheral pulses full/symmetric  Respiratory: Good air movement bilaterally, lungs clear, no wheezes/rales/rhonchi  Breasts: normal without suspicious masses, skin changes or axillary nodes.  GI: Abdomen soft, bowel sounds present, nondistended, nontender, no organomegaly or masses, no rebound/guarding  Vulva: No external lesions, normal hair distribution, no adenopathy  Vagina: Moist, pink, no abnormal discharge, well rugated, no lesions, urethra normal, perineum normal, small papular inflammation left external labium majora  Cervix: Pap smear is taken, parous, smooth, pink, no visible lesions, some white/yellow discharge, IUD strings visualized  Uterus: Normal size, anteverted, non-tender, mobile  Ovaries: No mass, non-tender, mobile  Musculoskeletal: No edema, normal muscle tone, normal gait  Neurologic: Alert and oriented, cranial  nerves 2-12 intact  Skin: No rashes/lesions  Psychiatric: normal mentation, affect and mood          Assessment and Plan:    Michelle was seen today for physical.    Diagnoses and all orders for this visit:    Family history of malignant neoplasm of breast  -     Mammogram, routine screening; Future    Encounter for screening mammogram for breast cancer  -     Mammogram, routine screening; Future  -     Pelvic and Breast Exam Procedure (exam)    Hypothyroidism, unspecified type  -     TSH with free T4 reflex; Future    Screening for cervical cancer  -     Pap imaged thin layer screen with HPV - recommended age 30 - 65 years (select HPV order below)  -     HPV High Risk Types DNA Cervical  -     Pelvic and Breast Exam Procedure (exam)    Screening for hyperlipidemia  -     Lipid Profile FASTING; Future          Return to clinic: annually or prn    Dena Guerra MD  Internal Medicine

## 2020-07-15 ENCOUNTER — ANCILLARY PROCEDURE (OUTPATIENT)
Dept: MAMMOGRAPHY | Facility: CLINIC | Age: 40
End: 2020-07-15
Attending: INTERNAL MEDICINE
Payer: COMMERCIAL

## 2020-07-15 DIAGNOSIS — Z80.3 FAMILY HISTORY OF MALIGNANT NEOPLASM OF BREAST: ICD-10-CM

## 2020-07-15 DIAGNOSIS — E03.9 HYPOTHYROIDISM, UNSPECIFIED TYPE: ICD-10-CM

## 2020-07-15 DIAGNOSIS — Z12.31 ENCOUNTER FOR SCREENING MAMMOGRAM FOR BREAST CANCER: ICD-10-CM

## 2020-07-15 RX ORDER — THYROID 120 MG/1
120 TABLET ORAL DAILY
Qty: 90 TABLET | Refills: 3 | Status: SHIPPED | OUTPATIENT
Start: 2020-07-15 | End: 2020-12-04

## 2020-07-16 LAB
COPATH REPORT: NORMAL
PAP: NORMAL

## 2020-07-17 LAB
FINAL DIAGNOSIS: ABNORMAL
HPV HR 12 DNA CVX QL NAA+PROBE: POSITIVE
HPV16 DNA SPEC QL NAA+PROBE: NEGATIVE
HPV18 DNA SPEC QL NAA+PROBE: NEGATIVE
SPECIMEN DESCRIPTION: ABNORMAL
SPECIMEN SOURCE CVX/VAG CYTO: ABNORMAL

## 2020-09-24 DIAGNOSIS — E03.9 HYPOTHYROIDISM, UNSPECIFIED TYPE: ICD-10-CM

## 2020-09-28 RX ORDER — LEVOTHYROXINE, LIOTHYRONINE 76; 18 UG/1; UG/1
TABLET ORAL
Qty: 90 TABLET | Refills: 3 | OUTPATIENT
Start: 2020-09-28

## 2020-11-14 ENCOUNTER — HEALTH MAINTENANCE LETTER (OUTPATIENT)
Age: 40
End: 2020-11-14

## 2020-12-02 DIAGNOSIS — E03.9 HYPOTHYROIDISM, UNSPECIFIED TYPE: ICD-10-CM

## 2020-12-04 RX ORDER — THYROID 120 MG/1
120 TABLET ORAL DAILY
Qty: 90 TABLET | Refills: 1 | Status: SHIPPED | OUTPATIENT
Start: 2020-12-04

## 2020-12-04 NOTE — TELEPHONE ENCOUNTER
Remaining refills sent to requested pharmacy.  thyroid (ARMOUR THYROID) 120 MG tablet 90 tablet 3 7/15/2020  No  Sig - Route: Take 1 tablet (120 mg) by mouth daily - Oral  Prescribing Provider's NPI: [id number]  Dena Guerra

## 2021-01-25 ENCOUNTER — TELEPHONE (OUTPATIENT)
Dept: INTERNAL MEDICINE | Facility: CLINIC | Age: 41
End: 2021-01-25

## 2021-01-25 NOTE — TELEPHONE ENCOUNTER
[facility] Call Center    Phone Message    May a detailed message be left on voicemail: yes     Reason for Call: Other: Pt called stating she has been having gynecology issues for about a month. Pt didnt give any details as to what the below the belt issues were but asked for a call back to discuss next steps in care. Please follow up     Action Taken: Message routed to:  [facility]    Travel Screening: Not Applicable

## 2021-01-26 ASSESSMENT — ANXIETY QUESTIONNAIRES
6. BECOMING EASILY ANNOYED OR IRRITABLE: NOT AT ALL
2. NOT BEING ABLE TO STOP OR CONTROL WORRYING: NOT AT ALL
7. FEELING AFRAID AS IF SOMETHING AWFUL MIGHT HAPPEN: NOT AT ALL
4. TROUBLE RELAXING: NOT AT ALL
3. WORRYING TOO MUCH ABOUT DIFFERENT THINGS: NOT AT ALL
GAD7 TOTAL SCORE: 0
1. FEELING NERVOUS, ANXIOUS, OR ON EDGE: NOT AT ALL
GAD7 TOTAL SCORE: 0
5. BEING SO RESTLESS THAT IT IS HARD TO SIT STILL: NOT AT ALL
7. FEELING AFRAID AS IF SOMETHING AWFUL MIGHT HAPPEN: NOT AT ALL

## 2021-01-27 ENCOUNTER — ANCILLARY PROCEDURE (OUTPATIENT)
Dept: ULTRASOUND IMAGING | Facility: CLINIC | Age: 41
End: 2021-01-27
Attending: NURSE PRACTITIONER
Payer: COMMERCIAL

## 2021-01-27 ENCOUNTER — OFFICE VISIT (OUTPATIENT)
Dept: FAMILY MEDICINE | Facility: CLINIC | Age: 41
End: 2021-01-27
Payer: COMMERCIAL

## 2021-01-27 VITALS
DIASTOLIC BLOOD PRESSURE: 67 MMHG | OXYGEN SATURATION: 99 % | SYSTOLIC BLOOD PRESSURE: 106 MMHG | HEART RATE: 75 BPM | TEMPERATURE: 97.9 F

## 2021-01-27 DIAGNOSIS — N93.0 PCB (POST COITAL BLEEDING): ICD-10-CM

## 2021-01-27 DIAGNOSIS — N93.0 PCB (POST COITAL BLEEDING): Primary | ICD-10-CM

## 2021-01-27 DIAGNOSIS — Z30.431 IUD CHECK UP: ICD-10-CM

## 2021-01-27 DIAGNOSIS — E03.9 ACQUIRED HYPOTHYROIDISM: ICD-10-CM

## 2021-01-27 DIAGNOSIS — Z13.220 SCREENING FOR HYPERLIPIDEMIA: ICD-10-CM

## 2021-01-27 LAB
CHOLEST SERPL-MCNC: 147 MG/DL
FERRITIN SERPL-MCNC: 33 NG/ML (ref 12–150)
HDLC SERPL-MCNC: 74 MG/DL
HGB BLD-MCNC: 14.1 G/DL (ref 11.7–15.7)
IRON SATN MFR SERPL: 29 % (ref 15–46)
IRON SERPL-MCNC: 107 UG/DL (ref 35–180)
LDLC SERPL CALC-MCNC: 62 MG/DL
NONHDLC SERPL-MCNC: 73 MG/DL
T3FREE SERPL-MCNC: 4.8 PG/ML (ref 2.3–4.2)
T4 FREE SERPL-MCNC: 0.98 NG/DL (ref 0.76–1.46)
TIBC SERPL-MCNC: 365 UG/DL (ref 240–430)
TRIGL SERPL-MCNC: 55 MG/DL
TSH SERPL DL<=0.005 MIU/L-ACNC: 5.74 MU/L (ref 0.4–4)

## 2021-01-27 PROCEDURE — 83550 IRON BINDING TEST: CPT | Performed by: PATHOLOGY

## 2021-01-27 PROCEDURE — 82728 ASSAY OF FERRITIN: CPT | Performed by: PATHOLOGY

## 2021-01-27 PROCEDURE — 84443 ASSAY THYROID STIM HORMONE: CPT | Performed by: PATHOLOGY

## 2021-01-27 PROCEDURE — 99000 SPECIMEN HANDLING OFFICE-LAB: CPT | Performed by: PATHOLOGY

## 2021-01-27 PROCEDURE — 36415 COLL VENOUS BLD VENIPUNCTURE: CPT | Performed by: PATHOLOGY

## 2021-01-27 PROCEDURE — 84439 ASSAY OF FREE THYROXINE: CPT | Performed by: PATHOLOGY

## 2021-01-27 PROCEDURE — 76830 TRANSVAGINAL US NON-OB: CPT | Mod: GC | Performed by: RADIOLOGY

## 2021-01-27 PROCEDURE — 99213 OFFICE O/P EST LOW 20 MIN: CPT | Performed by: NURSE PRACTITIONER

## 2021-01-27 PROCEDURE — 85018 HEMOGLOBIN: CPT | Performed by: PATHOLOGY

## 2021-01-27 PROCEDURE — 83540 ASSAY OF IRON: CPT | Performed by: PATHOLOGY

## 2021-01-27 PROCEDURE — 84481 FREE ASSAY (FT-3): CPT | Mod: 90 | Performed by: PATHOLOGY

## 2021-01-27 PROCEDURE — 76856 US EXAM PELVIC COMPLETE: CPT | Mod: GC | Performed by: RADIOLOGY

## 2021-01-27 PROCEDURE — 80061 LIPID PANEL: CPT | Performed by: PATHOLOGY

## 2021-01-27 ASSESSMENT — PAIN SCALES - GENERAL: PAINLEVEL: NO PAIN (0)

## 2021-01-27 ASSESSMENT — ANXIETY QUESTIONNAIRES: GAD7 TOTAL SCORE: 0

## 2021-01-27 NOTE — NURSING NOTE
40 year old  Chief Complaint   Patient presents with     Vaginal Bleeding     Pt has abnormal bleeding.        Blood pressure 106/67, pulse 75, temperature 97.9  F (36.6  C), temperature source Oral, SpO2 99 %, not currently breastfeeding. There is no height or weight on file to calculate BMI.  BP completed using cuff size:      TAMMY Garcia  January 27, 2021 11:16 AM

## 2021-01-27 NOTE — PATIENT INSTRUCTIONS
[facility] Medication Refill Request Information:  * Please contact your pharmacy regarding ANY request for medication refills.  ** [facility] Prescription Fax = [phone]  * Please allow 3 business days for routine medication refills.  * Please allow 5 business days for controlled substance medication refills.     [facility] Test Result notification information:  *You will be notified with in 7-10 days of your appointment day regarding the results of your test.  If you are on MyChart you will be notified as soon as the provider has reviewed the results and signed off on them.    [facility]: [phone]     If you have questions regarding Covid-19 and the Covid-19 vaccine, please visit this website.    [email or web address]

## 2021-01-27 NOTE — PROGRESS NOTES
HPI       Michelle Maier is a 40 year old woman who presents with multiple concerns:  Chief Complaint   Patient presents with     Vaginal Bleeding     Pt has abnormal bleeding.      PCB/IUD check up: Mom had cervical cancer in her 20's. Michelle has a history of abnormal paps, her last one showed HPV. She had a copper IUD placed 2017. She has had a new partner for the past year. She noted that since starting this new intimate relationship, she has vaginal bleeding after intercourse which is new for her. She reports that her partner can feel her IUD at times. She wants to make sure that this is in the correct place.   Acquired Hypothyroidism: managed with Stat thyroid, last TSH 4.34 checked in July 2020.     Problem, Medication and Allergy Lists were reviewed and updated if needed.    Patient is an established patient of this clinic.           Review of Systems:   Review of Systems     Constitutional:  Negative for fever, chills and fatigue.               Physical Exam:     Vitals:    01/27/21 1113   BP: 106/67   Pulse: 75   Temp: 97.9  F (36.6  C)   TempSrc: Oral   SpO2: 99%     There is no height or weight on file to calculate BMI.  Vitals were reviewed and were normal.     Physical Exam  Vitals signs reviewed.   Constitutional:       Appearance: Normal appearance.   HENT:      Head: Normocephalic.   Musculoskeletal: Normal range of motion.   Skin:     General: Skin is warm and dry.   Neurological:      General: No focal deficit present.      Mental Status: She is alert and oriented to person, place, and time.   Psychiatric:         Mood and Affect: Mood normal.         Behavior: Behavior normal.       Component      Latest Ref Rng & Units 1/27/2021   Cholesterol      <200 mg/dL 147   Triglycerides      <150 mg/dL 55   HDL Cholesterol      >49 mg/dL 74   LDL Cholesterol Calculated      <100 mg/dL 62   Non HDL Cholesterol      <130 mg/dL 73   Iron      35 - 180 ug/dL 107   Iron Binding Cap      240 -  430 ug/dL 365   Iron Saturation Index      15 - 46 % 29   TSH      0.40 - 4.00 mU/L 5.74 (H)   T4 Free      0.76 - 1.46 ng/dL 0.98   Free T3      2.3 - 4.2 pg/mL 4.8 (H)   Hemoglobin      11.7 - 15.7 g/dL 14.1   Ferritin      12 - 150 ng/mL 33   Impression Pelvic US:  1. Retroverted, retroflexed uterus with IUD in appropriate position.  2. Normal ultrasound of the remaining pelvis.    Results:   US and labs pending.   Assessment and Plan     1. PCB (post coital bleeding)    - US Pelvic w/ Transvaginal; Future  - Hemoglobin; Future  - Ferritin; Future  - Iron and iron binding capacity; Future    2. IUD check up    - US Pelvic w/ Transvaginal; Future    3. Acquired hypothyroidism    - TSH; Future  - T4 free; Future  - T3 Free; Future    Labs normal except TSH and free t3, she will talk to primary about this. US normal, continue to monitor bleeding. Options for treatment and follow-up care were reviewed with the patient. Michelle Maier  engaged in the decision making process and verbalized understanding of the options discussed and agreed with the final plan.  Faye Beebe, APRN, CNP

## 2021-01-28 ASSESSMENT — ENCOUNTER SYMPTOMS
FEVER: 0
CHILLS: 0
FATIGUE: 0

## 2021-02-02 ENCOUNTER — TELEPHONE (OUTPATIENT)
Dept: INTERNAL MEDICINE | Facility: CLINIC | Age: 41
End: 2021-02-02

## 2021-02-02 NOTE — TELEPHONE ENCOUNTER
[facility] Call Center    Phone Message    May a detailed message be left on voicemail: yes     Reason for Call: Other: Patient calling to discuss her lab results from 1/27/21. Patient had an appointment with Faye Beebe NP and was informed of a high thyroid lab. Please call to discuss thank you.      Action Taken: Message routed to:  [facility]    Travel Screening: Not Applicable

## 2021-02-03 NOTE — TELEPHONE ENCOUNTER
[facility] Call Center    Phone Message    May a detailed message be left on voicemail: yes     Reason for Call: Other: Call Back: Patient states she is getting anxious and would like a call back form the clinic as soon as possible to see what the next steps are. Please call patient back to advise.     Action Taken: Message routed to:  [facility]    Travel Screening: Not Applicable

## 2021-02-04 NOTE — TELEPHONE ENCOUNTER
Called patient and scheduled her for add on per provider ok. Patient was satisfied, no further questions.  Annette Mcdaniels, EMT at 11:26 AM on 2/4/2021.

## 2021-02-05 ENCOUNTER — VIRTUAL VISIT (OUTPATIENT)
Dept: INTERNAL MEDICINE | Facility: CLINIC | Age: 41
End: 2021-02-05
Payer: COMMERCIAL

## 2021-02-05 DIAGNOSIS — E03.9 HYPOTHYROIDISM, UNSPECIFIED TYPE: Primary | ICD-10-CM

## 2021-02-05 DIAGNOSIS — N93.9 ABNORMAL UTERINE BLEEDING (AUB): ICD-10-CM

## 2021-02-05 PROCEDURE — 99214 OFFICE O/P EST MOD 30 MIN: CPT | Mod: 95 | Performed by: INTERNAL MEDICINE

## 2021-02-05 RX ORDER — LEVOTHYROXINE SODIUM 150 UG/1
150 TABLET ORAL DAILY
Qty: 90 TABLET | Refills: 0 | Status: SHIPPED | OUTPATIENT
Start: 2021-02-05 | End: 2021-05-05

## 2021-02-05 NOTE — PROGRESS NOTES
Video Visit Technology for this patient: Nic not working, patient has smart device, please try [employer] Video with patient     Michelle is a 40 year old who is being evaluated via a billable video visit.      How would you like to obtain your AVS? MyChart  If the video visit is dropped, the invitation should be resent by: Text to cell phone: [phone]   Will anyone else be joining your video visit? No        Michelle Maier is a 40 year old female who is being evaluated via a billable video visit.      The patient has consented to a video visit and informed that video and telephone visits are being performed during the COVID-19 pandemic in order to mitigate the risk of an in office visit for appropriate candidates/issues. If during the course of the call the physician/provider feels a video visit is not appropriate, you will not be charged for this service. If the provider feels that they are unable to assess your concerns without an in person visit, you will be advised of this limitation and depending on the nature of the concern, advised to seek in person care if your provider feels you need urgent evaluation.      Subjective     Michelle Maier is a 40 year old female who presents to clinic today for the following health issues:    Chief Complaint   Patient presents with     Results     Pt would like to discuss tsh results        HPI    Bleeding irregular, noticed post coital, possibly longer periods, possible related to thyroid     TSH   Date Value Ref Range Status   01/27/2021 5.74 (H) 0.40 - 4.00 mU/L Final     Has TV US, no pelvic done  Diagnosed with thyroid condition in 2006 with fatigue and GI issues  Tired, gained weight back, hair texture changes    Routine Health Maintenence:  Immunizations (zoster, pneumovax, flu, Tdap, Hep A/B):   Most Recent Immunizations   Administered Date(s) Administered     HEPA 01/01/2010     Td (Adult), Adsorbed 01/01/2010     Typhoid IM 01/01/2010     Lipids: No  "results for input(s): CHOL, HDL, LDL, TRIG, CHOLHDLRATIO in the last 75588 hours.  Lung Ca Screening (>30 pk age 55-79 or >20 py age 50-79 + RF):  Colonoscopy (50-75 yrs): n/a  Dexa (>65W or 70M yrs): n/a  Mammogram (40-75 yrs): ordered  Pap (21-65 yrs): today  Pelvic/Breast: today  GC/Chlam (<25 yrs): n/a  HIV/HCV if risk factors: deferred  Safety/Lifestyle: reviewed  Tob/EtOH: low risk  Depression: screen neg  Advanced Directive: deferred    Review of external notes as documented above                   There is no problem list on file for this patient.    Past Surgical History:   Procedure Laterality Date     NO HISTORY OF SURGERY         Social History     Tobacco Use     Smoking status: Never Smoker     Smokeless tobacco: Never Used   Substance Use Topics     Alcohol use: Yes     Alcohol/week: 2.0 standard drinks     Types: 2 Standard drinks or equivalent per week     Family History   Problem Relation Age of Onset     Connective Tissue Disorder Mother      Cervical Cancer Mother         hysterectomy     Blood Disease Father         aplastic anemia and MDS, s/p BMT     Fibrocystic breast disease Paternal Aunt      Breast Cancer Paternal Cousin 29         Current Outpatient Medications   Medication Sig Dispense Refill     levothyroxine (SYNTHROID/LEVOTHROID) 150 MCG tablet Take 1 tablet (150 mcg) by mouth daily Take on empty stomach with no meds/vitamins/food for 60 min 90 tablet 0     thyroid (ARMOUR THYROID) 120 MG tablet Take 1 tablet (120 mg) by mouth daily 90 tablet 1     No Known Allergies      Review of Systems   A detailed ROS was performed and was negative unless indicated in the HPI above.        Physical Exam   There were no vitals taken for this visit.  Wt Readings from Last 2 Encounters:   07/13/20 66.2 kg (146 lb)   03/19/20 63.5 kg (140 lb)      Ht Readings from Last 2 Encounters:   03/19/20 1.702 m (5' 7\")   05/07/18 1.71 m (5' 7.32\")     GENERAL: healthy, alert and no distress  HEAD: " Normocephalic, atraumatic  EYES: Eyes grossly normal to inspection, EOMI and conjunctivae and sclerae normal  RESP: Speaking in full sentences, unlabored, no audible wheezes or cough  SKIN: no suspicious lesions or rashes, no jaundice  NEURO: oriented, and speech normal  PSYCH: mentation appears normal, affect normal/bright          Diagnostic Test Results:  Labs reviewed in Epic    TSH   Date Value Ref Range Status   01/27/2021 5.74 (H) 0.40 - 4.00 mU/L Final     T3 elevated              Assessment and Plan:  Michelle was seen today for results.    Diagnoses and all orders for this visit:    Hypothyroidism, unspecified type  Will change desiccated thyroid hormone to LT4. Difficult to convert dosage given weight base dosage differs significantly. Advised f/u in 6-8 weeks for labs and exam. Discussed indications for thyroid us.  -     Thyroid peroxidase antibody; Future  -     Anti thyroglobulin antibody; Future  -     TSH with free T4 reflex; Future  -     levothyroxine (SYNTHROID/LEVOTHROID) 150 MCG tablet; Take 1 tablet (150 mcg) by mouth daily Take on empty stomach with no meds/vitamins/food for 60 min    Abnormal uterine bleeding (AUB)  Possibly related to thyroid derangement but would consider pelvic exam if doesn't improve, prieto given HPV pos in past.        Video-Visit Details    Type of service:  Video Visit    Video Start/End Time: 12:32 PM 12:47 PM    Originating Location (pt. Location): Home    Distant Location (provider location):  [facility]     Mode of Communication:  Video Conference via  [employer] or  [employer]        Dena Guerra MD  Internal Medicine

## 2021-02-05 NOTE — NURSING NOTE
Chief Complaint   Patient presents with     Results     Pt would like to discuss tsh results      Elizabeth Smith, EMT at 11:50 AM sign on 2/5/2021

## 2021-05-03 DIAGNOSIS — E03.9 HYPOTHYROIDISM, UNSPECIFIED TYPE: ICD-10-CM

## 2021-05-05 RX ORDER — LEVOTHYROXINE SODIUM 150 UG/1
150 TABLET ORAL DAILY
Qty: 90 TABLET | Refills: 0 | Status: SHIPPED | OUTPATIENT
Start: 2021-05-05

## 2021-05-05 NOTE — TELEPHONE ENCOUNTER
Last Clinic Visit:   2/5/2021  [facility] [place]    Last note states..Will change desiccated thyroid hormone to LT4. Difficult to convert dosage given weight base dosage differs significantly. Advised f/u in 6-8 weeks for labs and exam. Discussed indications for thyroid us.  90 day christine refill sent to the pharmacy - including instructions for patient to call the clinic and schedule an appointment/labs.

## 2021-05-14 ENCOUNTER — TELEPHONE (OUTPATIENT)
Dept: INTERNAL MEDICINE | Facility: CLINIC | Age: 41
End: 2021-05-14

## 2021-05-14 NOTE — TELEPHONE ENCOUNTER
Left voice message for patient to call to schedule Return in about 2 months (around 4/5/2021) for with Dr Guerra, in person, with labs prior per PCP last visit in February. Phone number given to call back for scheduling.    Madie Mei, Clinic Coordinator, May 14, 2021 at 2:13 PM

## 2021-09-12 ENCOUNTER — HEALTH MAINTENANCE LETTER (OUTPATIENT)
Age: 41
End: 2021-09-12

## 2022-11-19 ENCOUNTER — HEALTH MAINTENANCE LETTER (OUTPATIENT)
Age: 42
End: 2022-11-19

## 2023-09-20 NOTE — TELEPHONE ENCOUNTER
I called and left VM. She will need in person appointment for further eval for gyn or pelvic problems. When she calls back please offer in person appointment with any female provider in [facility] or NP. For sure [facility] has some in person appointments available.   Annette Mcdaniels, EMT at 1:18 PM on 1/25/2021.      Patent

## 2023-11-18 ENCOUNTER — HEALTH MAINTENANCE LETTER (OUTPATIENT)
Age: 43
End: 2023-11-18

## 2024-04-06 ENCOUNTER — HEALTH MAINTENANCE LETTER (OUTPATIENT)
Age: 44
End: 2024-04-06